# Patient Record
Sex: MALE | URBAN - METROPOLITAN AREA
[De-identification: names, ages, dates, MRNs, and addresses within clinical notes are randomized per-mention and may not be internally consistent; named-entity substitution may affect disease eponyms.]

---

## 2022-08-25 ENCOUNTER — APPOINTMENT (OUTPATIENT)
Dept: URBAN - METROPOLITAN AREA CLINIC 265 | Age: 42
Setting detail: DERMATOLOGY
End: 2022-09-01

## 2022-08-25 VITALS — HEIGHT: 72 IN | WEIGHT: 315 LBS | RESPIRATION RATE: 18 BRPM

## 2022-08-25 DIAGNOSIS — L72.0 EPIDERMAL CYST: ICD-10-CM

## 2022-08-25 PROCEDURE — OTHER MEDICATION COUNSELING: OTHER

## 2022-08-25 PROCEDURE — OTHER PRESCRIPTION: OTHER

## 2022-08-25 PROCEDURE — OTHER PRESCRIPTION MEDICATION MANAGEMENT: OTHER

## 2022-08-25 PROCEDURE — OTHER COUNSELING: OTHER

## 2022-08-25 PROCEDURE — 99203 OFFICE O/P NEW LOW 30 MIN: CPT

## 2022-08-25 PROCEDURE — OTHER MIPS QUALITY: OTHER

## 2022-08-25 RX ORDER — DOXYCYCLINE HYCLATE 100 MG/1
TABLET, COATED ORAL
Qty: 14 | Refills: 0 | Status: ERX | COMMUNITY
Start: 2022-08-25

## 2022-08-25 ASSESSMENT — LOCATION ZONE DERM: LOCATION ZONE: NECK

## 2022-08-25 ASSESSMENT — LOCATION SIMPLE DESCRIPTION DERM
LOCATION SIMPLE: POSTERIOR NECK
LOCATION SIMPLE: TRAPEZIAL NECK

## 2022-08-25 ASSESSMENT — LOCATION DETAILED DESCRIPTION DERM
LOCATION DETAILED: MID TRAPEZIAL NECK
LOCATION DETAILED: LEFT INFERIOR POSTERIOR NECK
LOCATION DETAILED: RIGHT LATERAL NECK

## 2022-08-25 NOTE — PROCEDURE: MEDICATION COUNSELING
Please let him know his blood glucose level is elevated today at 308.  Thanks, MAGI Dutasteride Pregnancy And Lactation Text: This medication is absolutely contraindicated in women, especially during pregnancy and breast feeding. Feminization of male fetuses is possible if taking while pregnant.

## 2022-08-25 NOTE — PROCEDURE: PRESCRIPTION MEDICATION MANAGEMENT
Detail Level: Simple
Plan: Return to clinic in 4 weeks to reevaluate; possible punch excision of smaller lesion on side of neck\\npatient has appt with surgeon for hernia; recommended asking if surgeon would consider taking out all three cysts at the same time
Render In Strict Bullet Format?: No
Initiate Treatment: Doxycycline 100mg, take 1 pill by mouth twice a day x 7 days with food

## 2023-04-07 NOTE — PROCEDURE: MEDICATION COUNSELING
Dr Hale's message regarding lab results noted. Call to Patient through [facility] Interpretor. Detailed result message left as per Patient's instruction Ok to leave message. Also Patient to call back for any questions or concerns.    Niacinamide Counseling: I recommended taking niacin or niacinamide, also know as vitamin B3, twice daily. Recent evidence suggests that taking vitamin B3 (500 mg twice daily) can reduce the risk of actinic keratoses and non-melanoma skin cancers. Side effects of vitamin B3 include flushing and headache.